# Patient Record
Sex: MALE | Race: WHITE | HISPANIC OR LATINO | URBAN - METROPOLITAN AREA
[De-identification: names, ages, dates, MRNs, and addresses within clinical notes are randomized per-mention and may not be internally consistent; named-entity substitution may affect disease eponyms.]

---

## 2018-03-26 ENCOUNTER — EMERGENCY (EMERGENCY)
Facility: HOSPITAL | Age: 43
LOS: 1 days | Discharge: HOME | End: 2018-03-26
Attending: EMERGENCY MEDICINE | Admitting: EMERGENCY MEDICINE
Payer: COMMERCIAL

## 2018-03-26 VITALS
TEMPERATURE: 98 F | SYSTOLIC BLOOD PRESSURE: 161 MMHG | HEART RATE: 73 BPM | RESPIRATION RATE: 18 BRPM | DIASTOLIC BLOOD PRESSURE: 106 MMHG | OXYGEN SATURATION: 99 %

## 2018-03-26 DIAGNOSIS — J02.9 ACUTE PHARYNGITIS, UNSPECIFIED: ICD-10-CM

## 2018-03-26 DIAGNOSIS — R07.0 PAIN IN THROAT: ICD-10-CM

## 2018-03-26 LAB — S PYO AG SPEC QL IA: NEGATIVE — SIGNIFICANT CHANGE UP

## 2018-03-26 PROCEDURE — 99284 EMERGENCY DEPT VISIT MOD MDM: CPT | Mod: 25

## 2018-03-26 RX ORDER — KETOROLAC TROMETHAMINE 30 MG/ML
30 SYRINGE (ML) INJECTION ONCE
Qty: 0 | Refills: 0 | Status: DISCONTINUED | OUTPATIENT
Start: 2018-03-26 | End: 2018-03-26

## 2018-03-26 RX ORDER — DEXAMETHASONE 0.5 MG/5ML
10 ELIXIR ORAL ONCE
Qty: 0 | Refills: 0 | Status: COMPLETED | OUTPATIENT
Start: 2018-03-26 | End: 2018-03-26

## 2018-03-26 RX ADMIN — Medication 10 MILLIGRAM(S): at 00:47

## 2018-03-26 RX ADMIN — Medication 30 MILLIGRAM(S): at 01:01

## 2018-03-26 RX ADMIN — Medication 30 MILLIGRAM(S): at 00:48

## 2018-03-26 NOTE — ED PROVIDER NOTE - MEDICAL DECISION MAKING DETAILS
well appearing pt w/ 3 days of sore throat, no pooling of secretion, normal phonation, no trismus, no evidence of pta on exam, exudates noted, midline uvula, tolerating secretion, full rom of neck, low suspicion for rpa, will strep throat, supportive care

## 2018-03-26 NOTE — ED PROVIDER NOTE - OBJECTIVE STATEMENT
43 yom pw sore throat, subjective fever, since Friday.  small cough.  no fc, no trismus, no neck pain.  hx of recurrent strep throat.  no change in voice

## 2018-03-26 NOTE — ED PROVIDER NOTE - PHYSICAL EXAMINATION
CON: ao x 3, HENMT: swollen tonsils b/l, exudates noted, no palantine petechiae, midline uvula, no swelling of uvula, normal phonation, soft neck, no trismus, HEAD: atraumatic, CV: rrr, equal pulses b/l, RESP: cta b/l, LYMPH: shoddy cervical lymphadenopathy

## 2018-03-28 LAB
CULTURE RESULTS: SIGNIFICANT CHANGE UP
SPECIMEN SOURCE: SIGNIFICANT CHANGE UP

## 2021-07-15 NOTE — ED ADULT NURSE NOTE - NS ED NURSE RECORD ANOTHER HT AND WT
Anticoagulation Summary  As of 7/15/[facility] NURSE MSG POOL      Called patient with INR result and instructions to continue on same dose of warfarin. Recheck due in 4 weeks. No answer, left detailed message.  
No

## 2024-06-14 NOTE — ED ADULT NURSE NOTE - CAS EDN DISCHARGE ASSESSMENT
Per  filled 5/17/24  Upcoming appt.   Uds done 12/18/24  
The patient's prescription has been approved and sent to   [facility]  [address]  [place] [address]  Phone: [phone] Fax: [phone]    
Alert and oriented to person, place and time